# Patient Record
Sex: MALE | Race: BLACK OR AFRICAN AMERICAN | Employment: FULL TIME | URBAN - METROPOLITAN AREA
[De-identification: names, ages, dates, MRNs, and addresses within clinical notes are randomized per-mention and may not be internally consistent; named-entity substitution may affect disease eponyms.]

---

## 2017-01-10 ENCOUNTER — CLINICAL SUPPORT (OUTPATIENT)
Dept: FAMILY MEDICINE CLINIC | Age: 62
End: 2017-01-10

## 2017-01-10 DIAGNOSIS — E34.9 TESTOSTERONE DEFICIENCY: Primary | ICD-10-CM

## 2017-01-10 RX ORDER — TESTOSTERONE CYPIONATE 200 MG/ML
200 INJECTION INTRAMUSCULAR ONCE
Qty: 1 ML | Refills: 0
Start: 2017-01-10 | End: 2017-01-10

## 2017-01-10 NOTE — PATIENT INSTRUCTIONS
Testosterone (By injection)   Testosterone (agnes-TOS-ter-one)  Treats low testosterone levels. Also treats breast cancer in women and delayed puberty in male teenagers. Testosterone is a male hormone. Brand Name(s):Aveed, Delatestryl, Depo-Testosterone, Novaplus Depo-Testosterone, PremierPro RX Depo-Testosterone, Testone CIK   There may be other brand names for this medicine. When This Medicine Should Not Be Used: This medicine is not right for everyone. You should not receive it if you had an allergic reaction to testosterone, benzyl benzoate, or refined castor oil. A man should not receive this medicine if he has breast cancer or prostate cancer. A woman should not receive this medicine if she is pregnant or breastfeeding. How to Use This Medicine:   Injectable  · Your doctor will prescribe your exact dose and tell you how often it should be given. This medicine is given as a shot into one of your muscles. It is usually given in the buttocks. · A nurse or other health provider will give you this medicine. · This medicine should come with a Medication Guide. Ask your pharmacist for a copy if you do not have one. · Missed dose: Call your doctor or pharmacist for instructions. Drugs and Foods to Avoid:   Ask your doctor or pharmacist before using any other medicine, including over-the-counter medicines, vitamins, and herbal products. · Some medicines can affect how testosterone works. Tell your doctor if you are using any of the following:   ¨ Oxyphenbutazone  ¨ Blood thinner (including warfarin)  ¨ Insulin or diabetes medicine that you take by mouth  ¨ Steroid medicine (including dexamethasone, hydrocortisone, methylprednisolone, prednisolone, prednisone)  Warnings While Using This Medicine:   · It is not safe to take this medicine during pregnancy. It could harm an unborn baby. Tell your doctor right away if you become pregnant.   · Tell your doctor if you have kidney disease, liver disease, heart disease, diabetes, an enlarged prostate, high cholesterol, lung disease, sleep apnea, or a history of heart attack. · This medicine may cause the following problems:  ¨ Serious lung reaction called pulmonary oil embolism (may be life-threatening)  ¨ Increased risk of prostate cancer  ¨ Blood clot in your leg or lung  ¨ Slow growth in children  ¨ Liver problems  ¨ Possible increased risk of heart attack or stroke  · This medicine can cause women to develop male characteristics, such as a deep voice and facial hair growth. It can also cause a lower sperm count or swollen breasts in men. Talk to your doctor if you are concerned about this. · Your doctor will do lab tests at regular visits to check on the effects of this medicine. Keep all appointments. Possible Side Effects While Using This Medicine:   Call your doctor right away if you notice any of these side effects:  · Allergic reaction: Itching or hives, swelling in your face or hands, swelling or tingling in your mouth or throat, chest tightness, trouble breathing  · Change in how much or how often you urinate, trouble urinating  · Chest pain, cough, trouble breathing, dizziness, tightening of your throat, unusual sweating  · Dark urine or pale stools, nausea, vomiting, loss of appetite, stomach pain, yellow skin or eyes  · Pain, redness, or swelling in your arm or leg  · Swelling in your hands, ankles, or feet  · Unusual bleeding, bruising, or weakness  If you notice these less serious side effects, talk with your doctor:   · Acne, hoarse voice, facial hair growth (women)  · Changes in menstrual periods  · More erections than usual or erections that last a long time  · Pain or redness where the shot was given  · Swollen breasts (men)  If you notice other side effects that you think are caused by this medicine, tell your doctor. Call your doctor for medical advice about side effects.  You may report side effects to FDA at 9-330-TAZ-0809  © 2016 [facility] [address] [facility] is for End User's use only and may not be sold, redistributed or otherwise used for commercial purposes. The above information is an  only. It is not intended as medical advice for individual conditions or treatments. Talk to your doctor, nurse or pharmacist before following any medical regimen to see if it is safe and effective for you.

## 2021-08-09 ENCOUNTER — OFFICE VISIT (OUTPATIENT)
Dept: FAMILY MEDICINE CLINIC | Age: 66
End: 2021-08-09
Payer: COMMERCIAL

## 2021-08-09 VITALS
BODY MASS INDEX: 36.62 KG/M2 | WEIGHT: 270.4 LBS | HEIGHT: 72 IN | HEART RATE: 71 BPM | DIASTOLIC BLOOD PRESSURE: 73 MMHG | RESPIRATION RATE: 18 BRPM | OXYGEN SATURATION: 98 % | SYSTOLIC BLOOD PRESSURE: 152 MMHG

## 2021-08-09 DIAGNOSIS — R53.83 FATIGUE, UNSPECIFIED TYPE: Primary | ICD-10-CM

## 2021-08-09 DIAGNOSIS — Z71.89 ADVICE GIVEN ABOUT COVID-19 VIRUS INFECTION: ICD-10-CM

## 2021-08-09 DIAGNOSIS — M25.9 MULTIPLE JOINT COMPLAINTS: ICD-10-CM

## 2021-08-09 DIAGNOSIS — I10 ESSENTIAL HYPERTENSION: ICD-10-CM

## 2021-08-09 DIAGNOSIS — N52.9 ERECTILE DYSFUNCTION, UNSPECIFIED ERECTILE DYSFUNCTION TYPE: ICD-10-CM

## 2021-08-09 DIAGNOSIS — R06.83 PRIMARY SNORING: ICD-10-CM

## 2021-08-09 LAB — ERYTHROCYTE [SEDIMENTATION RATE] IN BLOOD: 6 MM/HR (ref 0–20)

## 2021-08-09 PROCEDURE — 99204 OFFICE O/P NEW MOD 45 MIN: CPT | Performed by: FAMILY MEDICINE

## 2021-08-09 RX ORDER — AMLODIPINE BESYLATE 5 MG/1
5 TABLET ORAL DAILY
Qty: 30 TABLET | Refills: 3 | Status: SHIPPED | OUTPATIENT
Start: 2021-08-09 | End: 2021-12-07

## 2021-08-09 NOTE — PROGRESS NOTES
HISTORY OF PRESENT ILLNESS  Cherylene Clos is a 60 y.o. male, Today's Pt main concerns were provided in the clinic,    pt is w/ comorbid history and unaware if the pt has been exposed to covid-19 individual, Pt Have been working for couple of wks,  pt has no fever no cough no dyspnea, no ha, not dizzy, nl smell nl taste, no N/V/D,     Joint pain  Hands fingers shoulders, started few months,   Work with equipemnt weed eater holding shoulders,  boths side,     Shoulder Pain   The history is provided by the patient. This is a chronic problem. Episode onset: few yrs ago, not obese, patient has a lot of difficulty with overhead activity, raising arm is very painful and the pain  awakens the patient at night,  The problem occurs constantly. The problem has not changed since onset. The same takes 2 Aleves daily, 4 days a wks helping,  The pain is present in the lt shoulder. The quality of the pain is described as dull. The pain is at a severity of 5/10. Associated symptoms include limited range of motion. Pertinent negatives include no numbness, ++stiffness, no tingling, no itching, no back pain and + neck pain. The symptoms are aggravated by movement and palpation. There has been no history of extremity trauma. Cramps also leg upper leg one or other ,so bad that he falls because of the pain,     Currently not vaccinated      Patient presents stating that has been feeling tired and fatigue does not go away has been feeling like this for a while,  W/ decreased libido does not use any alcoholic beverages no illicit drug no cigarette patient states that has not been under a lot of stress,   Currently on no oral multivitamin daily,  at this time denies any other complaint,       Current Outpatient Medications   Medication Sig Dispense Refill    sildenafil citrate (VIAGRA) 100 mg tablet Take 1 Tab by mouth as needed.  20-30 min before activity  Indications: Erectile Dysfunction 9 Tab 6    aspirin delayed-release 81 mg tablet Take 1 Tab by mouth daily. 30 Tab 11     No Known Allergies  Past Medical History:   Diagnosis Date    Chronic right shoulder pain 5/3/2016    ED (erectile dysfunction) 2/19/2015    Family history of diabetes mellitus (DM) 1/28/2015    Hematuria 2/19/2015    Hypercholesterolemia 1/28/2015    Need for pneumococcal vaccine 2/19/2015    Needs flu shot 2/19/2015    Prediabetes 2/19/2015    Screen for colon cancer 2/19/2015    Testosterone deficiency 5/13/2015    Urinary frequency 1/28/2015     No past surgical history on file. Family History   Problem Relation Age of Onset    Alcohol abuse Mother    Kassy Pencil Bladder Disease Mother     Alcohol abuse Father     Arthritis-osteo Father     Heart Disease Father     Cancer Sister      Social History     Tobacco Use    Smoking status: Never Smoker    Smokeless tobacco: Never Used   Substance Use Topics    Alcohol use: No      Lab Results   Component Value Date/Time    Cholesterol, total 174 10/26/2016 10:29 AM    HDL Cholesterol 57 10/26/2016 10:29 AM    LDL, calculated 100 (H) 10/26/2016 10:29 AM    Triglyceride 85 10/26/2016 10:29 AM     Lab Results   Component Value Date/Time    ALT (SGPT) 18 10/26/2016 10:29 AM    Alk. phosphatase 111 10/26/2016 10:29 AM    Bilirubin, total 0.2 10/26/2016 10:29 AM    Albumin 4.2 10/26/2016 10:29 AM    Protein, total 7.0 10/26/2016 10:29 AM    PLATELET 084 (L) 12/35/9597 10:29 AM     Lab Results   Component Value Date/Time    GFR est non-AA 94 10/26/2016 10:29 AM    GFR est  10/26/2016 10:29 AM    Creatinine 0.89 10/26/2016 10:29 AM    BUN 15 10/26/2016 10:29 AM    Sodium 140 10/26/2016 10:29 AM    Potassium 4.2 10/26/2016 10:29 AM    Chloride 102 10/26/2016 10:29 AM    CO2 22 10/26/2016 10:29 AM        Review of Systems   Constitutional: Negative for chills, fever and malaise/fatigue. HENT: Negative for nosebleeds. Eyes: Negative for pain. Respiratory: Negative for cough and wheezing. Cardiovascular: Negative for chest pain and leg swelling. Gastrointestinal: Negative for constipation, diarrhea and nausea. Genitourinary: Negative for frequency. Musculoskeletal: Negative for joint pain and myalgias. Skin: Negative for rash. Neurological: Negative for loss of consciousness and headaches. Endo/Heme/Allergies: Does not bruise/bleed easily. Psychiatric/Behavioral: Negative for depression. The patient is not nervous/anxious and does not have insomnia. All other systems reviewed and are negative. Physical Exam  Vitals and nursing note reviewed. Constitutional:       Appearance: He is well-developed. HENT:      Head: Normocephalic and atraumatic. Mouth/Throat:      Pharynx: No oropharyngeal exudate. Eyes:      Conjunctiva/sclera: Conjunctivae normal.   Cardiovascular:      Rate and Rhythm: Normal rate and regular rhythm. Heart sounds: Normal heart sounds. No murmur heard. Pulmonary:      Effort: Pulmonary effort is normal. No respiratory distress. Breath sounds: Normal breath sounds. Abdominal:      General: Bowel sounds are normal. There is no distension. Palpations: Abdomen is soft. Tenderness: There is no rebound. Musculoskeletal:         General: No tenderness. Cervical back: Normal range of motion and neck supple. Skin:     General: Skin is warm. Findings: No erythema. Neurological:      Mental Status: He is alert and oriented to person, place, and time. Psychiatric:         Behavior: Behavior normal.         ASSESSMENT and PLAN  Diagnoses and all orders for this visit:    1. Fatigue, unspecified type  -     TSH 3RD GENERATION; Future  -     T4, FREE; Future  -     REFERRAL TO CARDIOLOGY  -     SLEEP MEDICINE REFERRAL    2. Essential hypertension  -     amLODIPine (NORVASC) 5 mg tablet; Take 1 Tablet by mouth daily. 3. Multiple joint complaints  -     CBC WITH AUTOMATED DIFF; Future  -     LIPID PANEL;  Future  - HEMOGLOBIN A1C WITH EAG; Future  -     METABOLIC PANEL, COMPREHENSIVE; Future  -     URINALYSIS W/ RFLX MICROSCOPIC; Future  -     KALANI COMPREHENSIVE PLUS PANEL; Future  -     SED RATE (ESR); Future  -     C REACTIVE PROTEIN, QT; Future    4. Erectile dysfunction, unspecified erectile dysfunction type  -     PSA, DIAGNOSTIC (PROSTATE SPECIFIC AG); Future  -     PROLACTIN; Future  -     TESTOSTERONE, TOTAL, ADULT MALE; Future    5. Primary snoring  -     TSH 3RD GENERATION; Future  -     T4, FREE; Future  -     SLEEP MEDICINE REFERRAL    6. Advice given about COVID-19 virus infection          At this time patient was told to lose weight, so that the current body mass index would get into a close to 25 or between 20-25, patient was told that the patient can achieve this by starting an active life style modifications, working on the diet, increase physical activity, limit alcohol consumption, stop secondhand tobacco exposure,    for which includes creating a an interesting delightful to do list,  such as start of a light physical activity with a brisk daily walking 30 minutes most days of the week, most likely to total of 150 minutes per week, then the patient was told to try to avoid fatty fast foods, have a low-fat low-cholesterol diet, include seafood such as adding fatty fish such as [place], Mac[place]rel, [place] to the diet, increase vegetables and fruits, nuts 3-4 times per week and finally have a low-salt and K rich food intake for a good 4-6 months possibly for ever for the best outcome, patient was told that either a DASH diet or Mediterranean diet but satisfies the need     Routine labs ordered, and the needed abnormal labs will be discussed soon and they can be repeated in 3-6 months. In addition relevant handouts were given to the patient for a better understanding,    patient was told to call if any problems. Patient acknowledged understanding and  agreed with today's recommendations.

## 2021-08-09 NOTE — PROGRESS NOTES
Chief Complaint   Patient presents with   formerly [facility] Annual Wellness Visit     1. Have you been to the ER, urgent care clinic since your last visit? Hospitalized since your last visit? No    2. Have you seen or consulted any other health care providers outside of the [address] [place] [address] since your last visit? Include any pap smears or colon screening. No     Verified patient with two type of identifiers.   wife present,   C/o leg cramps,  Denies swelling, or constant pain

## 2021-08-10 LAB
ALBUMIN SERPL-MCNC: 3.8 G/DL (ref 3.5–5)
ALBUMIN/GLOB SERPL: 1.1 {RATIO} (ref 1.1–2.2)
ALP SERPL-CCNC: 148 U/L (ref 45–117)
ALT SERPL-CCNC: 34 U/L (ref 12–78)
ANION GAP SERPL CALC-SCNC: 5 MMOL/L (ref 5–15)
AST SERPL-CCNC: 26 U/L (ref 15–37)
BASOPHILS # BLD: 0 K/UL (ref 0–0.1)
BASOPHILS NFR BLD: 1 % (ref 0–1)
BILIRUB SERPL-MCNC: 0.4 MG/DL (ref 0.2–1)
BUN SERPL-MCNC: 14 MG/DL (ref 6–20)
BUN/CREAT SERPL: 17 (ref 12–20)
CALCIUM SERPL-MCNC: 9.1 MG/DL (ref 8.5–10.1)
CHLORIDE SERPL-SCNC: 108 MMOL/L (ref 97–108)
CHOLEST SERPL-MCNC: 145 MG/DL
CO2 SERPL-SCNC: 27 MMOL/L (ref 21–32)
CREAT SERPL-MCNC: 0.82 MG/DL (ref 0.7–1.3)
CRP SERPL-MCNC: 1.08 MG/DL (ref 0–0.6)
DIFFERENTIAL METHOD BLD: ABNORMAL
EOSINOPHIL # BLD: 0.2 K/UL (ref 0–0.4)
EOSINOPHIL NFR BLD: 4 % (ref 0–7)
ERYTHROCYTE [DISTWIDTH] IN BLOOD BY AUTOMATED COUNT: 15.6 % (ref 11.5–14.5)
EST. AVERAGE GLUCOSE BLD GHB EST-MCNC: 120 MG/DL
GLOBULIN SER CALC-MCNC: 3.4 G/DL (ref 2–4)
GLUCOSE SERPL-MCNC: 87 MG/DL (ref 65–100)
HBA1C MFR BLD: 5.8 % (ref 4–5.6)
HCT VFR BLD AUTO: 46.3 % (ref 36.6–50.3)
HDLC SERPL-MCNC: 59 MG/DL
HDLC SERPL: 2.5 {RATIO} (ref 0–5)
HGB BLD-MCNC: 14.5 G/DL (ref 12.1–17)
IMM GRANULOCYTES # BLD AUTO: 0 K/UL (ref 0–0.04)
IMM GRANULOCYTES NFR BLD AUTO: 0 % (ref 0–0.5)
LDLC SERPL CALC-MCNC: 72.2 MG/DL (ref 0–100)
LYMPHOCYTES # BLD: 1 K/UL (ref 0.8–3.5)
LYMPHOCYTES NFR BLD: 27 % (ref 12–49)
MCH RBC QN AUTO: 27.5 PG (ref 26–34)
MCHC RBC AUTO-ENTMCNC: 31.3 G/DL (ref 30–36.5)
MCV RBC AUTO: 87.7 FL (ref 80–99)
MONOCYTES # BLD: 0.3 K/UL (ref 0–1)
MONOCYTES NFR BLD: 7 % (ref 5–13)
NEUTS SEG # BLD: 2.3 K/UL (ref 1.8–8)
NEUTS SEG NFR BLD: 61 % (ref 32–75)
NRBC # BLD: 0 K/UL (ref 0–0.01)
NRBC BLD-RTO: 0 PER 100 WBC
PLATELET # BLD AUTO: 142 K/UL (ref 150–400)
PMV BLD AUTO: 12.9 FL (ref 8.9–12.9)
POTASSIUM SERPL-SCNC: 4.3 MMOL/L (ref 3.5–5.1)
PROLACTIN SERPL-MCNC: 11.8 NG/ML
PROT SERPL-MCNC: 7.2 G/DL (ref 6.4–8.2)
PSA SERPL-MCNC: 0.3 NG/ML (ref 0.01–4)
RBC # BLD AUTO: 5.28 M/UL (ref 4.1–5.7)
SODIUM SERPL-SCNC: 140 MMOL/L (ref 136–145)
T4 FREE SERPL-MCNC: 0.9 NG/DL (ref 0.8–1.5)
TRIGL SERPL-MCNC: 69 MG/DL (ref ?–150)
TSH SERPL DL<=0.05 MIU/L-ACNC: 1.08 UIU/ML (ref 0.36–3.74)
VLDLC SERPL CALC-MCNC: 13.8 MG/DL
WBC # BLD AUTO: 3.7 K/UL (ref 4.1–11.1)

## 2021-08-11 LAB
CENTROMERE B AB SER-ACNC: <0.2 AI (ref 0–0.9)
CHROMATIN AB SERPL-ACNC: <0.2 AI (ref 0–0.9)
COMMENT, TESC2: ABNORMAL
DSDNA AB SER-ACNC: 4 IU/ML (ref 0–9)
ENA JO1 AB SER-ACNC: <0.2 AI (ref 0–0.9)
ENA RNP AB SER-ACNC: 0.4 AI (ref 0–0.9)
ENA SCL70 AB SER-ACNC: <0.2 AI (ref 0–0.9)
ENA SM AB SER-ACNC: <0.2 AI (ref 0–0.9)
ENA SM+RNP AB SER-ACNC: <0.2 AI (ref 0–0.9)
ENA SS-A AB SER-ACNC: 0.4 AI (ref 0–0.9)
ENA SS-B AB SER-ACNC: 0.5 AI (ref 0–0.9)
RIBOSOMAL P AB SER-ACNC: <0.2 AI (ref 0–0.9)
SEE BELOW:, 164879: NORMAL
TESTOST SERPL-MCNC: 169 NG/DL (ref 264–916)

## 2021-12-05 DIAGNOSIS — I10 ESSENTIAL HYPERTENSION: ICD-10-CM

## 2021-12-07 RX ORDER — AMLODIPINE BESYLATE 5 MG/1
TABLET ORAL
Qty: 30 TABLET | Refills: 3 | Status: SHIPPED | OUTPATIENT
Start: 2021-12-07

## 2021-12-26 ENCOUNTER — HOSPITAL ENCOUNTER (EMERGENCY)
Age: 66
Discharge: HOME | End: 2021-12-26
Attending: EMERGENCY MEDICINE
Payer: COMMERCIAL

## 2021-12-26 VITALS
SYSTOLIC BLOOD PRESSURE: 136 MMHG | HEART RATE: 70 BPM | HEIGHT: 72 IN | DIASTOLIC BLOOD PRESSURE: 78 MMHG | WEIGHT: 250 LBS | BODY MASS INDEX: 33.86 KG/M2 | TEMPERATURE: 99.1 F | OXYGEN SATURATION: 100 % | RESPIRATION RATE: 18 BRPM

## 2021-12-26 DIAGNOSIS — Z20.822 SUSPECTED COVID-19 VIRUS INFECTION: Primary | ICD-10-CM

## 2021-12-26 DIAGNOSIS — Z20.822 PERSON UNDER INVESTIGATION FOR COVID-19: ICD-10-CM

## 2021-12-26 LAB
FLUAV AG NPH QL IA: NEGATIVE
FLUBV AG NOSE QL IA: NEGATIVE

## 2021-12-26 PROCEDURE — 87804 INFLUENZA ASSAY W/OPTIC: CPT

## 2021-12-26 PROCEDURE — U0005 INFEC AGEN DETEC AMPLI PROBE: HCPCS

## 2021-12-26 PROCEDURE — 99281 EMR DPT VST MAYX REQ PHY/QHP: CPT

## 2021-12-26 NOTE — Clinical Note
[address]  [facility] EMERGENCY DEPT  [address] [facility]  [place] [address]  [phone]    Work/School Note    Date: 12/26/2021     To Whom It May concern:    Maty Solano was evaluated by the following provider(s):  Attending Provider: Leroy Fermin MD  Physician Assistant: Dimitry Kim, [address] [place] [address] virus is suspected. Per the CDC guidelines we recommend home isolation until the following conditions are all met:    1. At least 10 days have passed since symptoms first appeared and  2. At least 24 hours have passed since last fever without the use of fever-reducing medications and  3.  Symptoms (e.g., cough, shortness of breath) have improved      Sincerely,          Hollie Dean

## 2021-12-26 NOTE — ED PROVIDER NOTES
EMERGENCY DEPARTMENT HISTORY AND PHYSICAL EXAM      Date: 12/26/2021  Patient Name: Marley Brown    History of Presenting Illness     Chief Complaint   Patient presents with    Cough     cough x 1 week. whole house has been coughing. pt wants a COVID  test because he has to go back to work tomorrow       History Provided By: Patient    HPI: Marley Brown, 73 y.o. male  PMHx as below presents to the ED with cc of  5 days of dry cough and decreased appetite. Pt is tolerating PO. He admits a sinus HA. Denies fever, ST, congestion, sputum production, wheezing, CP, SOB, N/V/D, abd pain, urinary sxs, rash. Pt's wife, daughter, and grandson are all pts in the ED with similar sxs. Pt is not COVID or flu vaccinated. Patient feels pretty good and is wondering if he is able to go back to work tomorrow. There are no other complaints, changes, or physical findings at this time. PCP: None    No current facility-administered medications on file prior to encounter. Current Outpatient Medications on File Prior to Encounter   Medication Sig Dispense Refill    amLODIPine (NORVASC) 5 mg tablet TAKE ONE TABLET BY MOUTH DAILY 30 Tablet 3    sildenafil citrate (VIAGRA) 100 mg tablet Take 1 Tab by mouth as needed. 20-30 min before activity  Indications: Erectile Dysfunction 9 Tab 6    aspirin delayed-release 81 mg tablet Take 1 Tab by mouth daily. 27 Tab 11       Past History     Past Medical History:  Past Medical History:   Diagnosis Date    Chronic right shoulder pain 5/3/2016    ED (erectile dysfunction) 2/19/2015    Family history of diabetes mellitus (DM) 1/28/2015    Hematuria 2/19/2015    Hypercholesterolemia 1/28/2015    Need for pneumococcal vaccine 2/19/2015    Needs flu shot 2/19/2015    Prediabetes 2/19/2015    Screen for colon cancer 2/19/2015    Testosterone deficiency 5/13/2015    Urinary frequency 1/28/2015       Past Surgical History:  No past surgical history on file.     Family History:  Family History   Problem Relation Age of Onset    Alcohol abuse Mother    Red Gasmen Bladder Disease Mother     Alcohol abuse Father     OSTEOARTHRITIS Father     Heart Disease Father     Cancer Sister        Social History:  Social History     Tobacco Use    Smoking status: Never Smoker    Smokeless tobacco: Never Used   Vaping Use    Vaping Use: Never used   Substance Use Topics    Alcohol use: No    Drug use: No       Allergies:  No Known Allergies      Review of Systems   Review of Systems   Constitutional: Negative for fever. HENT: Positive for sinus pressure. Respiratory: Positive for cough. Negative for shortness of breath. Cardiovascular: Negative for chest pain. Gastrointestinal: Negative for diarrhea and vomiting. Neurological: Negative for headaches. Physical Exam   Physical Exam  Vitals and nursing note reviewed. Constitutional:       General: He is not in acute distress. Appearance: Normal appearance. He is not toxic-appearing. HENT:      Head: Normocephalic and atraumatic. Nose: Nose normal.   Eyes:      Extraocular Movements: Extraocular movements intact. Conjunctiva/sclera: Conjunctivae normal.   Neck:      Trachea: Phonation normal.   Pulmonary:      Effort: Pulmonary effort is normal.      Comments: No coughing demonstrated  Musculoskeletal:         General: Normal range of motion. Cervical back: Normal range of motion and neck supple. Skin:     General: Skin is warm and dry. Neurological:      General: No focal deficit present. Mental Status: He is alert and oriented to person, place, and time. GCS: GCS eye subscore is 4. GCS verbal subscore is 5. GCS motor subscore is 6. Psychiatric:         Mood and Affect: Mood normal.         Behavior: Behavior normal.         Diagnostic Study Results     Labs -   No results found for this or any previous visit (from the past 12 hour(s)).     Radiologic Studies -   No orders to display CT Results  (Last 48 hours)    None        CXR Results  (Last 48 hours)    None            Medical Decision Making   I am the first provider for this patient. I reviewed the vital signs, available nursing notes, past medical history, past surgical history, family history and social history. Vital Signs-Reviewed the patient's vital signs. Patient Vitals for the past 12 hrs:   Temp Pulse Resp BP SpO2   12/26/21 1112 99.1 °F (37.3 °C) 70 18 136/78 100 %       Records Reviewed: Nursing Notes    Provider Notes (Medical Decision Making):   Patient is very well-appearing and in no acute distress. Vital signs are stable. No coughing demonstrated at time of exam.  Covid and flu tests are pending. Patient advised to quarantine until test results are back. If Covid is positive, he will need to quarantine per CDC guidelines. Advised on symptomatic treatment, rest, oral hydration at home. Follow-up with PCP. ED return precautions reviewed. ED Course:   Initial assessment performed. The patients presenting problems have been discussed, and they are in agreement with the care plan formulated and outlined with them. I have encouraged them to ask questions as they arise throughout their visit. Critical Care Time: None    Disposition:  D/c    PLAN:  1. Current Discharge Medication List        2. Follow-up Information     Follow up With Specialties Details Why Contact Info    [facility] EMERGENCY [facility]PT Emergency Medicine  As needed, If symptoms worsen [address]  [address] [facility]  [phone]    Your PCP  Call  For follow up         Return to ED if worse     Diagnosis     Clinical Impression:   1. Suspected COVID-19 virus infection    2. Person under investigation for COVID-19          Please note that this dictation was completed with [employer], the [employer] voice recognition software.  Quite often unanticipated grammatical, syntax, homophones, and other interpretive errors are inadvertently transcribed by the computer software. Please disregards these errors. Please excuse any errors that have escaped final proofreading.

## 2021-12-27 ENCOUNTER — PATIENT OUTREACH (OUTPATIENT)
Dept: CASE MANAGEMENT | Age: 66
End: 2021-12-27

## 2021-12-27 NOTE — PROGRESS NOTES
12/27/2021  2:25 PM    Patient contacted regarding COVID-19 risk. Discussed COVID-19 related testing which was pending at this time. Test results were pending. Patient informed of results, if available? Result Pending. Patient outreach attempt by this ACM today to perform initial post-discharge assessment; lvm requesting a return phone call to this ACM.

## 2021-12-28 LAB
SARS-COV-2, XPLCVT: DETECTED
SOURCE, COVRS: ABNORMAL

## 2021-12-28 NOTE — PROGRESS NOTES
12/28/2021  4:29 PM    Patient contacted regarding COVID-19 diagnosis. Discussed COVID-19 related testing which was available at this time. Test results were positive. Patient informed of results, if available? no.     Second patient outreach attempt by this ACM to perform initial post-discharge assessment; lvm requesting a return phone call to this ACM. [email or web address] message routed to patient with COVID-19 resources. COVID Care Transitions episode resolved at this time due to ACM inability to reach patient.

## 2022-09-28 ENCOUNTER — APPOINTMENT (OUTPATIENT)
Dept: ULTRASOUND IMAGING | Age: 67
End: 2022-09-28
Attending: EMERGENCY MEDICINE

## 2022-09-28 ENCOUNTER — HOSPITAL ENCOUNTER (EMERGENCY)
Age: 67
Discharge: HOME | End: 2022-09-29
Attending: EMERGENCY MEDICINE

## 2022-09-28 DIAGNOSIS — L03.116 LEFT LEG CELLULITIS: Primary | ICD-10-CM

## 2022-09-28 LAB
ALBUMIN SERPL-MCNC: 2.9 G/DL (ref 3.5–5)
ALBUMIN/GLOB SERPL: 0.6 {RATIO} (ref 1.1–2.2)
ALP SERPL-CCNC: 111 U/L (ref 45–117)
ALT SERPL-CCNC: 27 U/L (ref 12–78)
ANION GAP SERPL CALC-SCNC: 5 MMOL/L (ref 5–15)
AST SERPL-CCNC: 26 U/L (ref 15–37)
BASOPHILS # BLD: 0 K/UL (ref 0–0.1)
BASOPHILS NFR BLD: 0 % (ref 0–1)
BILIRUB SERPL-MCNC: 0.9 MG/DL (ref 0.2–1)
BUN SERPL-MCNC: 12 MG/DL (ref 6–20)
BUN/CREAT SERPL: 14 (ref 12–20)
CALCIUM SERPL-MCNC: 8.7 MG/DL (ref 8.5–10.1)
CHLORIDE SERPL-SCNC: 105 MMOL/L (ref 97–108)
CO2 SERPL-SCNC: 25 MMOL/L (ref 21–32)
CREAT SERPL-MCNC: 0.84 MG/DL (ref 0.7–1.3)
DIFFERENTIAL METHOD BLD: ABNORMAL
EOSINOPHIL # BLD: 0 K/UL (ref 0–0.4)
EOSINOPHIL NFR BLD: 0 % (ref 0–7)
ERYTHROCYTE [DISTWIDTH] IN BLOOD BY AUTOMATED COUNT: 13.9 % (ref 11.5–14.5)
GLOBULIN SER CALC-MCNC: 4.7 G/DL (ref 2–4)
GLUCOSE SERPL-MCNC: 87 MG/DL (ref 65–100)
HCT VFR BLD AUTO: 41.1 % (ref 36.6–50.3)
HGB BLD-MCNC: 13.4 G/DL (ref 12.1–17)
IMM GRANULOCYTES # BLD AUTO: 0 K/UL (ref 0–0.04)
IMM GRANULOCYTES NFR BLD AUTO: 0 % (ref 0–0.5)
LYMPHOCYTES # BLD: 1.1 K/UL (ref 0.8–3.5)
LYMPHOCYTES NFR BLD: 15 % (ref 12–49)
MCH RBC QN AUTO: 27.4 PG (ref 26–34)
MCHC RBC AUTO-ENTMCNC: 32.6 G/DL (ref 30–36.5)
MCV RBC AUTO: 84 FL (ref 80–99)
MONOCYTES # BLD: 0.6 K/UL (ref 0–1)
MONOCYTES NFR BLD: 9 % (ref 5–13)
NEUTS SEG # BLD: 5.5 K/UL (ref 1.8–8)
NEUTS SEG NFR BLD: 76 % (ref 32–75)
NRBC # BLD: 0 K/UL (ref 0–0.01)
NRBC BLD-RTO: 0 PER 100 WBC
PLATELET # BLD AUTO: 168 K/UL (ref 150–400)
PMV BLD AUTO: 12.1 FL (ref 8.9–12.9)
POTASSIUM SERPL-SCNC: 3.3 MMOL/L (ref 3.5–5.1)
PROT SERPL-MCNC: 7.6 G/DL (ref 6.4–8.2)
RBC # BLD AUTO: 4.89 M/UL (ref 4.1–5.7)
RBC MORPH BLD: ABNORMAL
SODIUM SERPL-SCNC: 135 MMOL/L (ref 136–145)
WBC # BLD AUTO: 7.2 K/UL (ref 4.1–11.1)
WBC MORPH BLD: ABNORMAL

## 2022-09-28 PROCEDURE — 87040 BLOOD CULTURE FOR BACTERIA: CPT

## 2022-09-28 PROCEDURE — 83605 ASSAY OF LACTIC ACID: CPT

## 2022-09-28 PROCEDURE — 93971 EXTREMITY STUDY: CPT

## 2022-09-28 PROCEDURE — 74011250637 HC RX REV CODE- 250/637: Performed by: EMERGENCY MEDICINE

## 2022-09-28 PROCEDURE — 85025 COMPLETE CBC W/AUTO DIFF WBC: CPT

## 2022-09-28 PROCEDURE — 99284 EMERGENCY DEPT VISIT MOD MDM: CPT

## 2022-09-28 PROCEDURE — 80053 COMPREHEN METABOLIC PANEL: CPT

## 2022-09-28 PROCEDURE — 36415 COLL VENOUS BLD VENIPUNCTURE: CPT

## 2022-09-28 RX ORDER — ACETAMINOPHEN 500 MG
1000 TABLET ORAL
Status: COMPLETED | OUTPATIENT
Start: 2022-09-28 | End: 2022-09-28

## 2022-09-28 RX ORDER — DOXYCYCLINE HYCLATE 100 MG
100 TABLET ORAL
Status: COMPLETED | OUTPATIENT
Start: 2022-09-28 | End: 2022-09-28

## 2022-09-28 RX ADMIN — ACETAMINOPHEN 1000 MG: 500 TABLET ORAL at 22:50

## 2022-09-28 RX ADMIN — DOXYCYCLINE HYCLATE 100 MG: 100 TABLET, COATED ORAL at 23:09

## 2022-09-29 VITALS
RESPIRATION RATE: 18 BRPM | SYSTOLIC BLOOD PRESSURE: 133 MMHG | BODY MASS INDEX: 35.86 KG/M2 | TEMPERATURE: 100.1 F | HEART RATE: 81 BPM | HEIGHT: 72 IN | WEIGHT: 264.77 LBS | DIASTOLIC BLOOD PRESSURE: 75 MMHG | OXYGEN SATURATION: 95 %

## 2022-09-29 LAB — LACTATE BLD-SCNC: 0.91 MMOL/L (ref 0.4–2)

## 2022-09-29 RX ORDER — DOXYCYCLINE HYCLATE 100 MG
100 TABLET ORAL 2 TIMES DAILY
Qty: 14 TABLET | Refills: 0 | Status: SHIPPED | OUTPATIENT
Start: 2022-09-29 | End: 2022-10-06

## 2022-09-29 NOTE — DISCHARGE INSTRUCTIONS
It was a pleasure taking care of you in our Emergency Department today. We know that when you come to [facility] you are entrusting us with your health, comfort, and safety. Our physicians and nurses honor that trust, and truly appreciate the opportunity to care for you and your loved ones. We also value your feedback. If you receive a survey about your Emergency Department experience today, please fill it out. We care about our patients' feedback, and we listen to what you have to say.   Thank you!       --- Dr. Ekaterina Gatica MD

## 2022-10-04 LAB
BACTERIA SPEC CULT: NORMAL
SERVICE CMNT-IMP: NORMAL

## 2022-10-04 NOTE — ED PROVIDER NOTES
EMERGENCY DEPARTMENT HISTORY AND PHYSICAL EXAM       Please note that this dictation was completed with [employer], the computer voice recognition software. Quite often unanticipated grammatical, syntax, homophones, and other interpretive errors are inadvertently transcribed by the computer software. Please disregard these errors. Please excuse any errors that have escaped final proofreading. Date: 9/28/2022  Patient Name: Hollis Graham  Patient Age and Sex: 73 y.o. male    History of Presenting Illness     Chief Complaint   Patient presents with    Leg Swelling     R sided, referred from [facility] to rule out DVT - redness and swelling x 2 days, [facility] provider believed cellulitis but wanted DVT ruled out    Neck Pain     Pt reports neck pain started 3 days ago, he initially believed was from falling asleep watching TV, worsening - had provider evaluate at [facility] and they prescribed muscle relaxants       History Provided By: Patient     Chief Complaint: right leg pain and swelling      HPI: Hollis Graham, is a 73 y.o. male is [facility] to rule out dvt in his right leg. He initially presented there with c/o pain and swelling in right leg that started about 4 days ago. He also noticed that the skin between ankle and calf was red and warm to touch. No wounds or drainage. No fever, chilsl. Pain is 6/10, constant. No prior trauma. 4    Pt denies any other alleviating or exacerbating factors. No other associated signs or symptoms. There are no other complaints, changes or physical findings at this time.      PCP: None    Past History   [facility] and verified by [facility]. -Cleo Schwartz MD    Past Medical History:  Past Medical History:   Diagnosis Date    Chronic right shoulder pain 5/3/2016    ED (erectile dysfunction) 2/19/2015    Family history of diabetes mellitus (DM) 1/28/2015    Hematuria 2/19/2015    Hypercholesterolemia 1/28/2015    Need for pneumococcal vaccine 2/19/2015    Needs flu shot 2/19/2015    Prediabetes 2/19/2015    Screen for colon cancer 2/19/2015    Testosterone deficiency 5/13/2015    Urinary frequency 1/28/2015       Past Surgical History:  No past surgical history on file. Family History:   Family History   Problem Relation Age of Onset    Alcohol abuse Mother     Gall Bladder Disease Mother     Alcohol abuse Father     OSTEOARTHRITIS Father     Heart Disease Father     Cancer Sister        Social History:  Social History     Tobacco Use    Smoking status: Never    Smokeless tobacco: Never   Vaping Use    Vaping Use: Never used   Substance Use Topics    Alcohol use: No    Drug use: No       Current Medications:  No current facility-administered medications on file prior to encounter. Current Outpatient Medications on File Prior to Encounter   Medication Sig Dispense Refill    amLODIPine (NORVASC) 5 mg tablet TAKE ONE TABLET BY MOUTH DAILY 30 Tablet 3    sildenafil citrate (VIAGRA) 100 mg tablet Take 1 Tab by mouth as needed. 20-30 min before activity  Indications: Erectile Dysfunction 9 Tab 6    aspirin delayed-release 81 mg tablet Take 1 Tab by mouth daily. 30 Tab 11       Allergies:  No Known Allergies    Review of Systems   All other systems reviewed and negative    Review of Systems   Constitutional:  Negative for fever. HENT:  Negative for congestion. Eyes:  Negative for visual disturbance. Respiratory:  Negative for shortness of breath. Cardiovascular:  Positive for leg swelling. Negative for chest pain and palpitations. Gastrointestinal:  Negative for abdominal pain and vomiting. Endocrine: Negative. Genitourinary:  Negative for dysuria and hematuria. Musculoskeletal:  Negative for joint swelling. Skin: Negative. Negative for rash. Neurological:  Negative for weakness and numbness. Psychiatric/Behavioral: Negative. Negative for confusion.     All other systems reviewed and are negative. Physical Exam   Reviewed patients vital signs and nursing note    Physical Exam  Vitals and nursing note reviewed. Constitutional:       Appearance: He is not diaphoretic. HENT:      Head: Atraumatic. Nose: Nose normal.      Mouth/Throat:      Mouth: Mucous membranes are moist.   Eyes:      Extraocular Movements: Extraocular movements intact. Conjunctiva/sclera: Conjunctivae normal.      Pupils: Pupils are equal, round, and reactive to light. Cardiovascular:      Rate and Rhythm: Normal rate and regular rhythm. Pulses: Normal pulses. Heart sounds: Normal heart sounds. Pulmonary:      Effort: Pulmonary effort is normal.      Breath sounds: Normal breath sounds. Abdominal:      Palpations: Abdomen is soft. Tenderness: There is no abdominal tenderness. Musculoskeletal:         General: Tenderness present. No signs of injury. Normal range of motion. Cervical back: Normal range of motion. No rigidity. Right lower leg: Edema present. Skin:     General: Skin is warm and dry. Capillary Refill: Capillary refill takes less than 2 seconds. Neurological:      General: No focal deficit present. Mental Status: He is alert and oriented to person, place, and time. Psychiatric:         Mood and Affect: Mood normal.         Behavior: Behavior normal.       Diagnostic Study Results     Labs - I have personally reviewed and interpreted all laboratory results. Interpretation of available and pertinent results detailed below in MDM. Ariel Dia MD, MSc  No results found for this or any previous visit (from the past 24 hour(s)). Radiologic Studies - I have personally reviewed and interpreted (see [facility] for brief interpreation of available results) all imaging studies and agree with radiology interpretation and report.  - Ariel Dia MD, MSc  DUPLEX LOWER EXT VENOUS RIGHT   Final Result            Medical Decision Making   I am the first provider for this patient. Records Reviewed:   I reviewed our electronic medical record system for any past medical records that were available that may contribute to the patient's current condition, including their PMH, surgical history, social and family history. This includes most recent ED visits, any available discharge summaries and prior ECGs, which I have reviewed and interpreted personally. I have summarized most salient findings in my HPI and MDM. Amairani Otero MD, MSc    I also reviewed the nursing notes and vital signs from today's visit. Nursing notes will be reviewed as they become available in realtime while the pt has been in the ED. Amairani Otero MD, MSc      Vital Signs-Reviewed the patient's vital signs. No data found. Provider Notes and Medical Decision Making:   Assessment: patient presents with r leg erythema, swelling, warmth to touch, tenderness. Affected is distal lower extremity. No wounds, abrasions, lacerations. No fluctuance. Ddx: cellulitis, dvt, lymphedema    Impression and Plan: us obtained which showed no evidence of dvt. Extremity is well perfused. Overall assessment c/w dx of cellulitis. Will treat with po abx and pain meds as needed. ED Course: The patients presenting problems have been discussed, and they are in agreement with the care plan formulated and outlined with them. I have encouraged them to ask questions as they arise throughout their visit. Progress note:  Patient has been reassessed and reports feeling considerably better, has normal vital signs and feels comfortable going home. I think this is reasonable as no findings today suggest a life-threatening condition. DISPOSITION: DISCHARGE  The patient's results have been reviewed with patient and available family and/or caregiver.  They verbally convey their understanding and agreement of the patient's signs, symptoms, diagnosis, treatment and prognosis and additionally agree to follow up as recommended in the discharge instructions or to return to the Emergency Department should the patient's condition change prior to their follow-up appointment. The patient and available family and/or caregiver verbally agree with the care plan and all of their questions have been answered. The discharge instructions have also been provided to the them with educational information regarding the patient's diagnosis as well a list of reasons why the patient would want to return to the ER prior to their follow-up appointment should any concerns arise, the patient's condition change or symptoms worsen. Benito Torres MD, Msc    PLAN:  Discharge Medication List as of 9/29/2022 12:54 AM        START taking these medications    Details   doxycycline (VIBRA-TABS) 100 mg tablet Take 1 Tablet by mouth two (2) times a day for 7 days. , Normal, Disp-14 Tablet, R-0           CONTINUE these medications which have NOT CHANGED    Details   amLODIPine (NORVASC) 5 mg tablet TAKE ONE TABLET BY MOUTH DAILY, Normal, Disp-30 Tablet, R-3      sildenafil citrate (VIAGRA) 100 mg tablet Take 1 Tab by mouth as needed. 20-30 min before activity  Indications: Erectile Dysfunction, Print, Disp-9 Tab, R-6      aspirin delayed-release 81 mg tablet Take 1 Tab by mouth daily. , Normal, Disp-30 Tab, R-11           2. Follow-up Information       Follow up With Specialties Details Why Contact Info    your primary care doctor  Schedule an appointment as soon as possible for a visit in 3 days for follow up     [facility] EMERGENCY DEPT Emergency Medicine Go to  As needed, If symptoms worsen [address]  [address] [facility]  [phone]          3. Return to ED if worse     I, Berto Pappas MD, am the attending of record for this patient encounter. Diagnosis     Final Clinical Impression:   1. Left leg cellulitis        Attestation:  I personally performed the services described in this documentation on this date 9/28/2022 for patient Kendra Mendoza. Derek Rangel MD

## 2024-01-29 LAB
ESTIMATED AVERAGE GLUCOSE: NORMAL
HBA1C MFR BLD: 6.3 %

## 2024-09-16 ENCOUNTER — HOSPITAL ENCOUNTER (EMERGENCY)
Facility: HOSPITAL | Age: 69
Discharge: HOME | End: 2024-09-16
Attending: STUDENT IN AN ORGANIZED HEALTH CARE EDUCATION/TRAINING PROGRAM
Payer: MEDICARE

## 2024-09-16 VITALS
BODY MASS INDEX: 35.89 KG/M2 | RESPIRATION RATE: 18 BRPM | HEART RATE: 70 BPM | HEIGHT: 72 IN | SYSTOLIC BLOOD PRESSURE: 139 MMHG | OXYGEN SATURATION: 98 % | WEIGHT: 264.99 LBS | DIASTOLIC BLOOD PRESSURE: 85 MMHG | TEMPERATURE: 98.1 F

## 2024-09-16 DIAGNOSIS — M54.50 ACUTE BILATERAL LOW BACK PAIN WITHOUT SCIATICA: Primary | ICD-10-CM

## 2024-09-16 PROCEDURE — 6360000002 HC RX W HCPCS: Performed by: STUDENT IN AN ORGANIZED HEALTH CARE EDUCATION/TRAINING PROGRAM

## 2024-09-16 PROCEDURE — 99284 EMERGENCY DEPT VISIT MOD MDM: CPT

## 2024-09-16 PROCEDURE — 6370000000 HC RX 637 (ALT 250 FOR IP): Performed by: STUDENT IN AN ORGANIZED HEALTH CARE EDUCATION/TRAINING PROGRAM

## 2024-09-16 PROCEDURE — 96372 THER/PROPH/DIAG INJ SC/IM: CPT

## 2024-09-16 RX ORDER — KETOROLAC TROMETHAMINE 30 MG/ML
15 INJECTION, SOLUTION INTRAMUSCULAR; INTRAVENOUS ONCE
Status: COMPLETED | OUTPATIENT
Start: 2024-09-16 | End: 2024-09-16

## 2024-09-16 RX ORDER — CYCLOBENZAPRINE HCL 10 MG
10 TABLET ORAL ONCE
Status: COMPLETED | OUTPATIENT
Start: 2024-09-16 | End: 2024-09-16

## 2024-09-16 RX ORDER — IBUPROFEN 600 MG/1
600 TABLET, FILM COATED ORAL 3 TIMES DAILY PRN
Qty: 20 TABLET | Refills: 0 | Status: SHIPPED | OUTPATIENT
Start: 2024-09-16

## 2024-09-16 RX ORDER — CYCLOBENZAPRINE HCL 10 MG
10 TABLET ORAL 3 TIMES DAILY PRN
Qty: 21 TABLET | Refills: 0 | Status: SHIPPED | OUTPATIENT
Start: 2024-09-16 | End: 2024-09-26

## 2024-09-16 RX ADMIN — CYCLOBENZAPRINE 10 MG: 10 TABLET, FILM COATED ORAL at 12:09

## 2024-09-16 RX ADMIN — KETOROLAC TROMETHAMINE 15 MG: 30 INJECTION, SOLUTION INTRAMUSCULAR at 12:08

## 2024-09-16 ASSESSMENT — PAIN SCALES - GENERAL: PAINLEVEL_OUTOF10: 0
